# Patient Record
Sex: MALE
[De-identification: names, ages, dates, MRNs, and addresses within clinical notes are randomized per-mention and may not be internally consistent; named-entity substitution may affect disease eponyms.]

---

## 2024-05-14 ENCOUNTER — APPOINTMENT (OUTPATIENT)
Dept: OTOLARYNGOLOGY | Facility: CLINIC | Age: 44
End: 2024-05-14
Payer: COMMERCIAL

## 2024-05-14 VITALS — WEIGHT: 195 LBS | HEIGHT: 72 IN | BODY MASS INDEX: 26.41 KG/M2

## 2024-05-14 DIAGNOSIS — J30.1 ALLERGIC RHINITIS DUE TO POLLEN: ICD-10-CM

## 2024-05-14 DIAGNOSIS — R09.81 NASAL CONGESTION: ICD-10-CM

## 2024-05-14 PROBLEM — Z00.00 ENCOUNTER FOR PREVENTIVE HEALTH EXAMINATION: Status: ACTIVE | Noted: 2024-05-14

## 2024-05-14 PROCEDURE — 31231 NASAL ENDOSCOPY DX: CPT

## 2024-05-14 PROCEDURE — 99204 OFFICE O/P NEW MOD 45 MIN: CPT | Mod: 25

## 2024-05-14 RX ORDER — AZELASTINE HYDROCHLORIDE 137 UG/1
0.1 SPRAY, METERED NASAL DAILY
Qty: 1 | Refills: 3 | Status: ACTIVE | COMMUNITY
Start: 2024-05-14 | End: 1900-01-01

## 2024-05-14 NOTE — ASSESSMENT
[FreeTextEntry1] : Start flonase 2 sprays to each nare.  Start azelastine 2 sprays to each nare.  Follow up in 3 months.  if no improvement will consider CT sinus scan.

## 2024-05-14 NOTE — PHYSICAL EXAM
[Midline] : trachea located in midline position [Normal] : palpation of lymph nodes is normal [de-identified] : Edematous

## 2024-05-14 NOTE — PROCEDURE
[FreeTextEntry6] : Indication: nasal congestion Nasal endoscopy: procedure and findings: Nasal endoscopy consent was performed.  The nares were topicalized with 2 sprays of lidocaine 2% and oxymetazoline to each nare. The flexible scope was passed through bilateral nares to examine the bilateral nasal cavities and choana.   Findings:  Left nasal cavity: severe, pale edema, no masses, no bleeding, OMC clear. Right nasal cavity: severe, pale edema, no masses, no bleeding, OMC clear. Choana: Clear Patient tolerated the procedure well without complications.  Findings consistent with allergic rhinitis

## 2024-06-14 RX ORDER — FLUTICASONE PROPIONATE 50 UG/1
50 SPRAY, METERED NASAL
Qty: 1 | Refills: 3 | Status: ACTIVE | COMMUNITY
Start: 2024-05-14 | End: 1900-01-01

## 2024-08-23 ENCOUNTER — APPOINTMENT (OUTPATIENT)
Dept: OTOLARYNGOLOGY | Facility: CLINIC | Age: 44
End: 2024-08-23
Payer: COMMERCIAL

## 2024-08-23 DIAGNOSIS — J32.9 CHRONIC SINUSITIS, UNSPECIFIED: ICD-10-CM

## 2024-08-23 DIAGNOSIS — R09.81 NASAL CONGESTION: ICD-10-CM

## 2024-08-23 DIAGNOSIS — J30.1 ALLERGIC RHINITIS DUE TO POLLEN: ICD-10-CM

## 2024-08-23 PROCEDURE — 31231 NASAL ENDOSCOPY DX: CPT

## 2024-08-23 PROCEDURE — 99214 OFFICE O/P EST MOD 30 MIN: CPT | Mod: 25

## 2024-08-23 RX ORDER — MOMETASONE 50 UG/1
50 SPRAY, METERED NASAL DAILY
Qty: 1 | Refills: 3 | Status: ACTIVE | COMMUNITY
Start: 2024-08-23 | End: 1900-01-01

## 2024-08-23 NOTE — ASSESSMENT
[FreeTextEntry1] : Chronic sinusitis without improvement on flonase and azelastine.  Mometaone spray in lieu of flonase , continue azelastine.  Recommend CT sinus non contrast.  Follow up with me after scan in about 6 weeks.

## 2024-08-23 NOTE — HISTORY OF PRESENT ILLNESS
[FreeTextEntry1] :  Patient presents today c/o chronic sinusitis. Patient states he has been using the nasal sprays.  He states since their last visit their symptoms have stayed the same with no change.  No further complaints

## 2024-08-23 NOTE — PROCEDURE
[FreeTextEntry6] : Indication: nasal congestion.  Nasal endoscopy: procedure and findings: Nasal endoscopy consent was performed.  The nares were topicalized with 2 sprays of lidocaine 2% and oxymetazoline to each nare. The flexible scope was passed through bilateral nares to examine the bilateral nasal cavities and choana.   Findings:  Left nasal cavity: moderate edema, no masses, no bleeding, OMC clear. Right nasal cavity: moderate edema, no masses, no bleeding, OMC clear. Choana: clear Patient tolerated the procedure well without complications.

## 2024-09-06 ENCOUNTER — TRANSCRIPTION ENCOUNTER (OUTPATIENT)
Age: 44
End: 2024-09-06

## 2024-11-09 ENCOUNTER — RESULT REVIEW (OUTPATIENT)
Age: 44
End: 2024-11-09

## 2024-11-09 ENCOUNTER — OUTPATIENT (OUTPATIENT)
Dept: OUTPATIENT SERVICES | Facility: HOSPITAL | Age: 44
LOS: 1 days | End: 2024-11-09
Payer: COMMERCIAL

## 2024-11-09 DIAGNOSIS — R09.81 NASAL CONGESTION: ICD-10-CM

## 2024-11-09 DIAGNOSIS — Z00.8 ENCOUNTER FOR OTHER GENERAL EXAMINATION: ICD-10-CM

## 2024-11-09 PROCEDURE — 70486 CT MAXILLOFACIAL W/O DYE: CPT | Mod: 26

## 2024-11-09 PROCEDURE — 70486 CT MAXILLOFACIAL W/O DYE: CPT

## 2024-11-10 DIAGNOSIS — R09.81 NASAL CONGESTION: ICD-10-CM

## 2024-11-19 ENCOUNTER — APPOINTMENT (OUTPATIENT)
Dept: OTOLARYNGOLOGY | Facility: CLINIC | Age: 44
End: 2024-11-19
Payer: COMMERCIAL

## 2024-11-19 DIAGNOSIS — Z87.891 PERSONAL HISTORY OF NICOTINE DEPENDENCE: ICD-10-CM

## 2024-11-19 DIAGNOSIS — Z82.49 FAMILY HISTORY OF ISCHEMIC HEART DISEASE AND OTHER DISEASES OF THE CIRCULATORY SYSTEM: ICD-10-CM

## 2024-11-19 DIAGNOSIS — Z83.438 FAMILY HISTORY OF OTHER DISORDER OF LIPOPROTEIN METABOLISM AND OTHER LIPIDEMIA: ICD-10-CM

## 2024-11-19 DIAGNOSIS — Z78.9 OTHER SPECIFIED HEALTH STATUS: ICD-10-CM

## 2024-11-19 DIAGNOSIS — Z83.3 FAMILY HISTORY OF DIABETES MELLITUS: ICD-10-CM

## 2024-11-19 DIAGNOSIS — J32.9 CHRONIC SINUSITIS, UNSPECIFIED: ICD-10-CM

## 2024-11-19 DIAGNOSIS — R09.81 NASAL CONGESTION: ICD-10-CM

## 2024-11-19 DIAGNOSIS — J30.1 ALLERGIC RHINITIS DUE TO POLLEN: ICD-10-CM

## 2024-11-19 PROCEDURE — 99213 OFFICE O/P EST LOW 20 MIN: CPT

## 2024-11-19 RX ORDER — FAMOTIDINE 40 MG/1
40 TABLET, FILM COATED ORAL
Refills: 0 | Status: ACTIVE | COMMUNITY